# Patient Record
Sex: FEMALE | URBAN - METROPOLITAN AREA
[De-identification: names, ages, dates, MRNs, and addresses within clinical notes are randomized per-mention and may not be internally consistent; named-entity substitution may affect disease eponyms.]

---

## 2023-09-28 ENCOUNTER — OFFICE VISIT (OUTPATIENT)
Dept: URBAN - METROPOLITAN AREA CLINIC 92 | Facility: CLINIC | Age: 43
End: 2023-09-28
Payer: COMMERCIAL

## 2023-09-28 ENCOUNTER — LAB OUTSIDE AN ENCOUNTER (OUTPATIENT)
Dept: URBAN - METROPOLITAN AREA CLINIC 92 | Facility: CLINIC | Age: 43
End: 2023-09-28

## 2023-09-28 VITALS
HEART RATE: 76 BPM | DIASTOLIC BLOOD PRESSURE: 64 MMHG | BODY MASS INDEX: 26.2 KG/M2 | TEMPERATURE: 97.3 F | HEIGHT: 62 IN | WEIGHT: 142.4 LBS | SYSTOLIC BLOOD PRESSURE: 105 MMHG

## 2023-09-28 DIAGNOSIS — Z12.11 COLON CANCER SCREENING: ICD-10-CM

## 2023-09-28 DIAGNOSIS — Z80.0 FAMILY HISTORY OF COLON CANCER: ICD-10-CM

## 2023-09-28 DIAGNOSIS — K62.89 RECTAL PAIN: ICD-10-CM

## 2023-09-28 PROCEDURE — 99203 OFFICE O/P NEW LOW 30 MIN: CPT | Performed by: INTERNAL MEDICINE

## 2023-09-28 RX ORDER — DOCONEXENT, NIACINAMIDE, .ALPHA.-TOCOPHEROL ACETATE, DL-, CHOLECALCIFEROL, .BETA.-CAROTENE, ASCORBIC ACID, THIAMINE MONONITRATE, RIBOFLAVIN, PYRIDOXINE HYDROCHLORIDE, CYANOCOBALAMIN, IRON, ZINC OXIDE, CUPRIC OXIDE, POTASSIUM IODIDE, MAGNESIUM OXIDE, FOLIC ACID, AND LEVOMEFOLATE CALCIUM 200; 15; 20; 1000; 1100; 30; 1.6; 1.8; 2.5; 12; 29; 25; 2; 150; 20; .4; .6 MG/1; MG/1; [IU]/1; [IU]/1; [IU]/1; MG/1; MG/1; MG/1; MG/1; UG/1; MG/1; MG/1; MG/1; UG/1; MG/1; MG/1; MG/1
CAPSULE, LIQUID FILLED ORAL
Qty: 30 CAPSULE | Status: ON HOLD | COMMUNITY

## 2023-09-28 RX ORDER — NITROFURANTOIN MONOHYDRATE/MACROCRYSTALLINE 25; 75 MG/1; MG/1
CAPSULE ORAL
Qty: 10 CAPSULE | Status: ON HOLD | COMMUNITY

## 2023-09-28 RX ORDER — AZITHROMYCIN 250 MG/1
TAKE 2 TABLETS BY MOUTH TODAY, THEN TAKE 1 TABLET DAILY FOR 4 DAYS TABLET, FILM COATED ORAL
Qty: 6 EACH | Refills: 0 | Status: ON HOLD | COMMUNITY

## 2023-09-28 RX ORDER — AMOXICILLIN 500 MG/1
TAKE ONE TABLET BY MOUTH FOUR TIMES A DAY UNTIL GONE TABLET, FILM COATED ORAL
Qty: 28 UNSPECIFIED | Refills: 0 | Status: ON HOLD | COMMUNITY

## 2023-09-28 RX ORDER — ESTRADIOL 0.1 MG/G
APPLY A PEA SIZED AMOUNT INTO VAGINA DAILY FOR 2 WEEKS, THEN INSERT TWICE WEEKLY FOR MAINTENANCE CREAM VAGINAL
Qty: 42.5 GRAM | Refills: 0 | Status: ON HOLD | COMMUNITY

## 2023-09-28 RX ORDER — ONDANSETRON 4 MG/1
2 TABLETS TABLET, FILM COATED ORAL
Qty: 2 TABLETS | Refills: 0 | OUTPATIENT
Start: 2023-09-28

## 2023-09-28 RX ORDER — CRISABOROLE 20 MG/G
APPLY TO AFFECT AREAS ON HANDS TWICE A DAY AS NEEDED FOR FLARES OINTMENT TOPICAL
Qty: 60 GRAM | Refills: 0 | Status: ON HOLD | COMMUNITY

## 2023-09-28 RX ORDER — SERTRALINE HYDROCHLORIDE 25 MG/1
TABLET ORAL
Qty: 90 TABLET | Status: ON HOLD | COMMUNITY

## 2023-09-28 RX ORDER — TOBRAMYCIN AND DEXAMETHASONE 1; 3 MG/ML; MG/ML
SUSPENSION/ DROPS OPHTHALMIC
Qty: 5 MILLILITER | Status: ON HOLD | COMMUNITY

## 2023-09-28 RX ORDER — SODIUM, POTASSIUM,MAG SULFATES 17.5-3.13G
354 ML SOLUTION, RECONSTITUTED, ORAL ORAL AS DIRECTED
Qty: 354 ML | Refills: 0 | OUTPATIENT
Start: 2023-09-28 | End: 2023-09-29

## 2023-09-28 NOTE — HPI-TODAY'S VISIT:
41 yo fem ref by Dr Kim Paul for a gi consultation and a copy of this note will be sent to the ref provider. Patient was seen in 2017 when she had rectal pain and diarrhea and a flexible sigmoidoscopy was done that was grossly normal.  Random biopsies were done of the left colon revealed focal active colitis but no architectural changes to suggest a chronic process.  Patient did not follow-up after that exam. Pt has 2 kids now. Pt lives in [place].  Pt goes to bathroom daily. NO GERD or anemia.  Both her maternal grandparents had colon ca. Pt wanted to get screened due to this. Pt has some rectal pain (has a new IUD). Had IUD related pain 7 years ago until it was removed as well.  Had IUD checked recently.

## 2023-12-18 ENCOUNTER — OFFICE VISIT (OUTPATIENT)
Dept: URBAN - METROPOLITAN AREA SURGERY CENTER 16 | Facility: SURGERY CENTER | Age: 43
End: 2023-12-18

## 2024-01-23 ENCOUNTER — OFFICE VISIT (OUTPATIENT)
Dept: URBAN - METROPOLITAN AREA SURGERY CENTER 16 | Facility: SURGERY CENTER | Age: 44
End: 2024-01-23

## 2024-03-11 ENCOUNTER — COLON (OUTPATIENT)
Dept: URBAN - METROPOLITAN AREA SURGERY CENTER 16 | Facility: SURGERY CENTER | Age: 44
End: 2024-03-11
Payer: COMMERCIAL

## 2024-03-11 ENCOUNTER — LAB (OUTPATIENT)
Dept: URBAN - METROPOLITAN AREA CLINIC 4 | Facility: CLINIC | Age: 44
End: 2024-03-11
Payer: COMMERCIAL

## 2024-03-11 DIAGNOSIS — D12.2 ADENOMA OF ASCENDING COLON: ICD-10-CM

## 2024-03-11 DIAGNOSIS — Z12.11 COLON CANCER SCREENING: ICD-10-CM

## 2024-03-11 DIAGNOSIS — Z80.0 BROTHER AT YOUNG AGE FAMILY HISTORY OF COLON CANCER: ICD-10-CM

## 2024-03-11 DIAGNOSIS — D12.2 BENIGN NEOPLASM OF ASCENDING COLON: ICD-10-CM

## 2024-03-11 PROCEDURE — 88305 TISSUE EXAM BY PATHOLOGIST: CPT | Performed by: PATHOLOGY

## 2024-03-11 PROCEDURE — 45380 COLONOSCOPY AND BIOPSY: CPT | Performed by: INTERNAL MEDICINE

## 2024-03-11 RX ORDER — DOCONEXENT, NIACINAMIDE, .ALPHA.-TOCOPHEROL ACETATE, DL-, CHOLECALCIFEROL, .BETA.-CAROTENE, ASCORBIC ACID, THIAMINE MONONITRATE, RIBOFLAVIN, PYRIDOXINE HYDROCHLORIDE, CYANOCOBALAMIN, IRON, ZINC OXIDE, CUPRIC OXIDE, POTASSIUM IODIDE, MAGNESIUM OXIDE, FOLIC ACID, AND LEVOMEFOLATE CALCIUM 200; 15; 20; 1000; 1100; 30; 1.6; 1.8; 2.5; 12; 29; 25; 2; 150; 20; .4; .6 MG/1; MG/1; [IU]/1; [IU]/1; [IU]/1; MG/1; MG/1; MG/1; MG/1; UG/1; MG/1; MG/1; MG/1; UG/1; MG/1; MG/1; MG/1
CAPSULE, LIQUID FILLED ORAL
Qty: 30 CAPSULE | Status: ON HOLD | COMMUNITY

## 2024-03-11 RX ORDER — AZITHROMYCIN 250 MG/1
TAKE 2 TABLETS BY MOUTH TODAY, THEN TAKE 1 TABLET DAILY FOR 4 DAYS TABLET, FILM COATED ORAL
Qty: 6 EACH | Refills: 0 | Status: ON HOLD | COMMUNITY

## 2024-03-11 RX ORDER — AMOXICILLIN 500 MG/1
TAKE ONE TABLET BY MOUTH FOUR TIMES A DAY UNTIL GONE TABLET, FILM COATED ORAL
Qty: 28 UNSPECIFIED | Refills: 0 | Status: ON HOLD | COMMUNITY

## 2024-03-11 RX ORDER — NITROFURANTOIN MONOHYDRATE/MACROCRYSTALLINE 25; 75 MG/1; MG/1
CAPSULE ORAL
Qty: 10 CAPSULE | Status: ON HOLD | COMMUNITY

## 2024-03-11 RX ORDER — ESTRADIOL 0.1 MG/G
APPLY A PEA SIZED AMOUNT INTO VAGINA DAILY FOR 2 WEEKS, THEN INSERT TWICE WEEKLY FOR MAINTENANCE CREAM VAGINAL
Qty: 42.5 GRAM | Refills: 0 | Status: ON HOLD | COMMUNITY

## 2024-03-11 RX ORDER — CRISABOROLE 20 MG/G
APPLY TO AFFECT AREAS ON HANDS TWICE A DAY AS NEEDED FOR FLARES OINTMENT TOPICAL
Qty: 60 GRAM | Refills: 0 | Status: ON HOLD | COMMUNITY

## 2024-03-11 RX ORDER — SERTRALINE HYDROCHLORIDE 25 MG/1
TABLET ORAL
Qty: 90 TABLET | Status: ON HOLD | COMMUNITY

## 2024-03-11 RX ORDER — ONDANSETRON 4 MG/1
2 TABLETS TABLET, FILM COATED ORAL
Qty: 2 TABLETS | Refills: 0 | Status: ACTIVE | COMMUNITY
Start: 2023-09-28

## 2024-03-11 RX ORDER — TOBRAMYCIN AND DEXAMETHASONE 1; 3 MG/ML; MG/ML
SUSPENSION/ DROPS OPHTHALMIC
Qty: 5 MILLILITER | Status: ON HOLD | COMMUNITY

## 2024-03-12 ENCOUNTER — COLON (OUTPATIENT)
Dept: URBAN - METROPOLITAN AREA SURGERY CENTER 16 | Facility: SURGERY CENTER | Age: 44
End: 2024-03-12

## 2024-04-08 ENCOUNTER — TELEP (OUTPATIENT)
Dept: URBAN - METROPOLITAN AREA TELEHEALTH 2 | Facility: TELEHEALTH | Age: 44
End: 2024-04-08

## 2024-04-26 PROBLEM — 428283002: Status: ACTIVE | Noted: 2024-04-26

## 2024-04-29 ENCOUNTER — TELEP (OUTPATIENT)
Dept: URBAN - METROPOLITAN AREA TELEHEALTH 2 | Facility: TELEHEALTH | Age: 44
End: 2024-04-29
Payer: COMMERCIAL

## 2024-04-29 VITALS — WEIGHT: 142 LBS | HEIGHT: 62 IN | BODY MASS INDEX: 26.13 KG/M2

## 2024-04-29 DIAGNOSIS — Z86.010 PERSONAL HISTORY OF COLONIC POLYPS: ICD-10-CM

## 2024-04-29 DIAGNOSIS — Z80.0 FAMILY HISTORY OF COLON CANCER: ICD-10-CM

## 2024-04-29 PROCEDURE — 99204 OFFICE O/P NEW MOD 45 MIN: CPT | Performed by: INTERNAL MEDICINE

## 2024-04-29 RX ORDER — SERTRALINE HYDROCHLORIDE 25 MG/1
TABLET ORAL
Qty: 90 TABLET | COMMUNITY

## 2024-04-29 RX ORDER — CRISABOROLE 20 MG/G
APPLY TO AFFECT AREAS ON HANDS TWICE A DAY AS NEEDED FOR FLARES OINTMENT TOPICAL
Qty: 60 GRAM | Refills: 0 | COMMUNITY

## 2024-04-29 RX ORDER — AZITHROMYCIN 250 MG/1
TAKE 2 TABLETS BY MOUTH TODAY, THEN TAKE 1 TABLET DAILY FOR 4 DAYS TABLET, FILM COATED ORAL
Qty: 6 EACH | Refills: 0 | COMMUNITY

## 2024-04-29 RX ORDER — NITROFURANTOIN MONOHYDRATE/MACROCRYSTALLINE 25; 75 MG/1; MG/1
CAPSULE ORAL
Qty: 10 CAPSULE | COMMUNITY

## 2024-04-29 RX ORDER — TOBRAMYCIN AND DEXAMETHASONE 1; 3 MG/ML; MG/ML
SUSPENSION/ DROPS OPHTHALMIC
Qty: 5 MILLILITER | COMMUNITY

## 2024-04-29 RX ORDER — DOCONEXENT, NIACINAMIDE, .ALPHA.-TOCOPHEROL ACETATE, DL-, CHOLECALCIFEROL, .BETA.-CAROTENE, ASCORBIC ACID, THIAMINE MONONITRATE, RIBOFLAVIN, PYRIDOXINE HYDROCHLORIDE, CYANOCOBALAMIN, IRON, ZINC OXIDE, CUPRIC OXIDE, POTASSIUM IODIDE, MAGNESIUM OXIDE, FOLIC ACID, AND LEVOMEFOLATE CALCIUM 200; 15; 20; 1000; 1100; 30; 1.6; 1.8; 2.5; 12; 29; 25; 2; 150; 20; .4; .6 MG/1; MG/1; [IU]/1; [IU]/1; [IU]/1; MG/1; MG/1; MG/1; MG/1; UG/1; MG/1; MG/1; MG/1; UG/1; MG/1; MG/1; MG/1
CAPSULE, LIQUID FILLED ORAL
Qty: 30 CAPSULE | COMMUNITY

## 2024-04-29 RX ORDER — AMOXICILLIN 500 MG/1
TAKE ONE TABLET BY MOUTH FOUR TIMES A DAY UNTIL GONE TABLET, FILM COATED ORAL
Qty: 28 UNSPECIFIED | Refills: 0 | COMMUNITY

## 2024-04-29 RX ORDER — ESTRADIOL 0.1 MG/G
APPLY A PEA SIZED AMOUNT INTO VAGINA DAILY FOR 2 WEEKS, THEN INSERT TWICE WEEKLY FOR MAINTENANCE CREAM VAGINAL
Qty: 42.5 GRAM | Refills: 0 | COMMUNITY

## 2024-04-29 NOTE — HPI-TODAY'S VISIT:
44 yo fem ref by Sole Schuler NP or [facility] for a gi fu and a copy of this note will be sent to the ref provider. Pt goes to bathroom daily. NO GERD or anemia.  Both her maternal grandparents had colon ca. Pt wanted to get screened due to this. Pt has some rectal pain (has a new IUD). Had IUD related pain 7 years ago until it was removed as well. Today's visit- Pt now has a new Mirena.  And underwent a colonoscopy on March 11, 2020 for this revealed internal hemorrhoids and a small polyp removed from the ascending colon otherwise normal exam.  Pathology showed a tubular adenoma so I recommended repeat exam in 5 years. Pt is doing fine. She says she is not having bleeding or issues from hemorrhoids. Pt has not had rectal pain since the procedure. Pt is not having her period anymore. Pt denies constipation. Has some anxiety related loose stools rarely.